# Patient Record
Sex: FEMALE | HISPANIC OR LATINO
[De-identification: names, ages, dates, MRNs, and addresses within clinical notes are randomized per-mention and may not be internally consistent; named-entity substitution may affect disease eponyms.]

---
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
## 2025-06-30 NOTE — REASON FOR VISIT
Patient received her lab results and wants to discuss them with you.   [Initial Consult] : an initial consult for [Morbid Obesity (BMI<40)] : morbid obesity (bmi<40) [FreeTextEntry2] : For Bariatric Surgery and Medical Weight Loss Management